# Patient Record
Sex: MALE | Race: OTHER | Employment: STUDENT | URBAN - METROPOLITAN AREA
[De-identification: names, ages, dates, MRNs, and addresses within clinical notes are randomized per-mention and may not be internally consistent; named-entity substitution may affect disease eponyms.]

---

## 2023-12-03 ENCOUNTER — HOSPITAL ENCOUNTER (EMERGENCY)
Age: 23
Discharge: HOME | End: 2023-12-03
Attending: STUDENT IN AN ORGANIZED HEALTH CARE EDUCATION/TRAINING PROGRAM
Payer: COMMERCIAL

## 2023-12-03 VITALS
TEMPERATURE: 99 F | WEIGHT: 159.9 LBS | BODY MASS INDEX: 24.23 KG/M2 | RESPIRATION RATE: 16 BRPM | DIASTOLIC BLOOD PRESSURE: 84 MMHG | HEIGHT: 68 IN | HEART RATE: 65 BPM | SYSTOLIC BLOOD PRESSURE: 146 MMHG | OXYGEN SATURATION: 97 %

## 2023-12-03 DIAGNOSIS — J06.9 VIRAL URI WITH COUGH: ICD-10-CM

## 2023-12-03 DIAGNOSIS — J02.9 ACUTE PHARYNGITIS, UNSPECIFIED ETIOLOGY: Primary | ICD-10-CM

## 2023-12-03 LAB
FLUAV RNA UPPER RESP QL NAA+PROBE: NEGATIVE
FLUBV AG NPH QL: NEGATIVE
S PYO AG THROAT QL: NEGATIVE
SARS-COV-2 RDRP RESP QL NAA+PROBE: NOT DETECTED

## 2023-12-03 PROCEDURE — 99283 EMERGENCY DEPT VISIT LOW MDM: CPT

## 2023-12-03 PROCEDURE — 87880 STREP A ASSAY W/OPTIC: CPT

## 2023-12-03 PROCEDURE — 6360000002 HC RX W HCPCS: Performed by: STUDENT IN AN ORGANIZED HEALTH CARE EDUCATION/TRAINING PROGRAM

## 2023-12-03 PROCEDURE — 87081 CULTURE SCREEN ONLY: CPT

## 2023-12-03 PROCEDURE — 87804 INFLUENZA ASSAY W/OPTIC: CPT

## 2023-12-03 PROCEDURE — 87635 SARS-COV-2 COVID-19 AMP PRB: CPT

## 2023-12-03 RX ORDER — DEXAMETHASONE SODIUM PHOSPHATE 4 MG/ML
10 INJECTION, SOLUTION INTRA-ARTICULAR; INTRALESIONAL; INTRAMUSCULAR; INTRAVENOUS; SOFT TISSUE ONCE
Status: COMPLETED | OUTPATIENT
Start: 2023-12-03 | End: 2023-12-03

## 2023-12-03 RX ADMIN — DEXAMETHASONE SODIUM PHOSPHATE 10 MG: 4 INJECTION, SOLUTION INTRAMUSCULAR; INTRAVENOUS at 14:17

## 2023-12-03 ASSESSMENT — PAIN DESCRIPTION - LOCATION: LOCATION: THROAT;HEAD

## 2023-12-03 ASSESSMENT — PAIN - FUNCTIONAL ASSESSMENT: PAIN_FUNCTIONAL_ASSESSMENT: 0-10

## 2023-12-03 ASSESSMENT — PAIN SCALES - GENERAL: PAINLEVEL_OUTOF10: 7

## 2023-12-03 NOTE — ED PROVIDER NOTES
[facility]            Pt Name: Jacky Weeks   MRN: [id number]   [address] [place] 2000   Date of evaluation: 12/3/2023   Provider: Indra Corado MD   PCP: No primary care provider on file. Note Started: 1:57 PM EST 12/3/23          CHIEF COMPLAINT     Chief Complaint   Patient presents with    URI     Pt presents with cough, sore throat, congestion and hoarseness x 1 week. Went to Urgent Care on Monday and tested negative for covid and strep        HISTORY OF PRESENT ILLNESS:   History from : Patient   Limitations to history : None     Jacky Weeks is a 26 y.o. male who presents with complaints of cough with sputum production, sore throat, nasal and chest congestion. Patient states that symptoms have been ongoing for the past week. He does not believe he is have any fevers but does does not have a thermometer at home. He denies any shortness of breath or chest pain. He has been drinking fluids, taking over-the-counter medications for his symptoms. Was seen in urgent care on Monday and tested negative for COVID and strep at that time. He states that he is not really feeling any better. He denies any abdominal pain, nausea or vomiting. He denies any other complaints or concerns. Nursing Notes were all reviewed and agreed with, or any disagreements were addressed in the HPI. REVIEW OF SYSTEMS :    Positives and Pertinent negatives as per HPI. MEDICAL HISTORY   has no past medical history on file. No past surgical history on file. CURRENTMEDICATIONS       There are no discharge medications for this patient.      SCREENINGS          [place] Coma Scale  Eye Opening: Spontaneous  Best Verbal Response: Oriented  Best Motor Response: Obeys commands  [place] Coma Scale Score: 15                CIWA Assessment  BP: (!) 146/84  Pulse: 65                  PHYSICAL EXAM :  ED Triage Vitals   BP Temp Temp src Pulse Resp SpO2 Height Weight

## 2023-12-06 LAB — S PYO THROAT QL CULT: NORMAL
